# Patient Record
Sex: MALE | Race: WHITE | Employment: UNEMPLOYED | URBAN - METROPOLITAN AREA
[De-identification: names, ages, dates, MRNs, and addresses within clinical notes are randomized per-mention and may not be internally consistent; named-entity substitution may affect disease eponyms.]

---

## 2019-03-09 ENCOUNTER — APPOINTMENT (OUTPATIENT)
Dept: CT IMAGING | Facility: HOSPITAL | Age: 64
End: 2019-03-09
Attending: EMERGENCY MEDICINE
Payer: MEDICARE

## 2019-03-09 ENCOUNTER — APPOINTMENT (OUTPATIENT)
Dept: GENERAL RADIOLOGY | Facility: HOSPITAL | Age: 64
End: 2019-03-09
Attending: EMERGENCY MEDICINE
Payer: MEDICARE

## 2019-03-09 ENCOUNTER — HOSPITAL ENCOUNTER (OUTPATIENT)
Facility: HOSPITAL | Age: 64
Setting detail: OBSERVATION
Discharge: HOME | End: 2019-03-11
Attending: EMERGENCY MEDICINE | Admitting: INTERNAL MEDICINE
Payer: MEDICARE

## 2019-03-09 DIAGNOSIS — R56.9 NEW ONSET SEIZURE (HCC): Primary | ICD-10-CM

## 2019-03-09 LAB
ALBUMIN SERPL-MCNC: 4.6 G/DL (ref 3.4–5)
ALBUMIN/GLOB SERPL: 1.3 {RATIO} (ref 1–2)
ALP LIVER SERPL-CCNC: 62 U/L (ref 45–117)
ALT SERPL-CCNC: 28 U/L (ref 16–61)
AMPHET UR QL SCN: NEGATIVE
ANION GAP SERPL CALC-SCNC: 16 MMOL/L (ref 0–18)
AST SERPL-CCNC: 29 U/L (ref 15–37)
BARBITURATES UR QL SCN: NEGATIVE
BASOPHILS # BLD AUTO: 0.04 X10(3) UL (ref 0–0.2)
BASOPHILS NFR BLD AUTO: 0.4 %
BENZODIAZ UR QL SCN: NEGATIVE
BILIRUB SERPL-MCNC: 0.8 MG/DL (ref 0.1–2)
BILIRUB UR QL STRIP.AUTO: NEGATIVE
BUN BLD-MCNC: 13 MG/DL (ref 7–18)
BUN/CREAT SERPL: 9.8 (ref 10–20)
CALCIUM BLD-MCNC: 9.4 MG/DL (ref 8.5–10.1)
CHLORIDE SERPL-SCNC: 101 MMOL/L (ref 98–107)
CO2 SERPL-SCNC: 20 MMOL/L (ref 21–32)
COCAINE UR QL: NEGATIVE
CREAT BLD-MCNC: 1.32 MG/DL (ref 0.7–1.3)
CREAT UR-SCNC: 46.9 MG/DL
DEPRECATED RDW RBC AUTO: 38.3 FL (ref 35.1–46.3)
EOSINOPHIL # BLD AUTO: 0.03 X10(3) UL (ref 0–0.7)
EOSINOPHIL NFR BLD AUTO: 0.3 %
ERYTHROCYTE [DISTWIDTH] IN BLOOD BY AUTOMATED COUNT: 11.8 % (ref 11–15)
ETHANOL SERPL-MCNC: <3 MG/DL (ref ?–3)
ETHANOL UR-MCNC: NEGATIVE MG/DL
GLOBULIN PLAS-MCNC: 3.5 G/DL (ref 2.8–4.4)
GLUCOSE BLD-MCNC: 174 MG/DL (ref 70–99)
GLUCOSE UR STRIP.AUTO-MCNC: 50 MG/DL
HCT VFR BLD AUTO: 46.4 % (ref 39–53)
HGB BLD-MCNC: 16.5 G/DL (ref 13–17.5)
IMM GRANULOCYTES # BLD AUTO: 0.03 X10(3) UL (ref 0–1)
IMM GRANULOCYTES NFR BLD: 0.3 %
LEUKOCYTE ESTERASE UR QL STRIP.AUTO: NEGATIVE
LYMPHOCYTES # BLD AUTO: 1.79 X10(3) UL (ref 1–4)
LYMPHOCYTES NFR BLD AUTO: 19.8 %
M PROTEIN MFR SERPL ELPH: 8.1 G/DL (ref 6.4–8.2)
MCH RBC QN AUTO: 31.5 PG (ref 26–34)
MCHC RBC AUTO-ENTMCNC: 35.6 G/DL (ref 31–37)
MCV RBC AUTO: 88.5 FL (ref 80–100)
MONOCYTES # BLD AUTO: 0.45 X10(3) UL (ref 0.1–1)
MONOCYTES NFR BLD AUTO: 5 %
NEUTROPHILS # BLD AUTO: 6.71 X10 (3) UL (ref 1.5–7.7)
NEUTROPHILS # BLD AUTO: 6.71 X10(3) UL (ref 1.5–7.7)
NEUTROPHILS NFR BLD AUTO: 74.2 %
NITRITE UR QL STRIP.AUTO: NEGATIVE
OPIATES UR QL SCN: NEGATIVE
OSMOLALITY SERPL CALC.SUM OF ELEC: 288 MOSM/KG (ref 275–295)
PCP UR QL SCN: NEGATIVE
PH UR STRIP.AUTO: 6 [PH] (ref 4.5–8)
PLATELET # BLD AUTO: 204 10(3)UL (ref 150–450)
POTASSIUM SERPL-SCNC: 3.8 MMOL/L (ref 3.5–5.1)
PROT UR STRIP.AUTO-MCNC: 30 MG/DL
RBC # BLD AUTO: 5.24 X10(6)UL (ref 3.8–5.8)
RBC UR QL AUTO: NEGATIVE
SODIUM SERPL-SCNC: 137 MMOL/L (ref 136–145)
SP GR UR STRIP.AUTO: 1.01 (ref 1–1.03)
UROBILINOGEN UR STRIP.AUTO-MCNC: <2 MG/DL
WBC # BLD AUTO: 9.1 X10(3) UL (ref 4–11)

## 2019-03-09 PROCEDURE — 71045 X-RAY EXAM CHEST 1 VIEW: CPT | Performed by: EMERGENCY MEDICINE

## 2019-03-09 PROCEDURE — 70450 CT HEAD/BRAIN W/O DYE: CPT | Performed by: EMERGENCY MEDICINE

## 2019-03-09 RX ORDER — MULTIVIT-MIN/IRON FUM/FOLIC AC 7.5 MG-4
1 TABLET ORAL DAILY
COMMUNITY
End: 2020-03-19 | Stop reason: DRUGHIGH

## 2019-03-09 RX ORDER — LORAZEPAM 2 MG/ML
1 INJECTION INTRAMUSCULAR ONCE
Status: COMPLETED | OUTPATIENT
Start: 2019-03-09 | End: 2019-03-09

## 2019-03-09 RX ORDER — ONDANSETRON 2 MG/ML
4 INJECTION INTRAMUSCULAR; INTRAVENOUS ONCE
Status: COMPLETED | OUTPATIENT
Start: 2019-03-09 | End: 2019-03-09

## 2019-03-09 RX ORDER — SODIUM CHLORIDE 9 MG/ML
INJECTION, SOLUTION INTRAVENOUS CONTINUOUS
Status: DISCONTINUED | OUTPATIENT
Start: 2019-03-09 | End: 2019-03-11

## 2019-03-10 ENCOUNTER — APPOINTMENT (OUTPATIENT)
Dept: CV DIAGNOSTICS | Facility: HOSPITAL | Age: 64
End: 2019-03-10
Attending: CLINICAL NURSE SPECIALIST
Payer: MEDICARE

## 2019-03-10 LAB
ALBUMIN SERPL-MCNC: 4 G/DL (ref 3.4–5)
ALBUMIN/GLOB SERPL: 1.3 {RATIO} (ref 1–2)
ALP LIVER SERPL-CCNC: 53 U/L (ref 45–117)
ALT SERPL-CCNC: 24 U/L (ref 16–61)
ANION GAP SERPL CALC-SCNC: 8 MMOL/L (ref 0–18)
AST SERPL-CCNC: 30 U/L (ref 15–37)
ATRIAL RATE: 274 BPM
ATRIAL RATE: 64 BPM
ATRIAL RATE: 73 BPM
BASOPHILS # BLD AUTO: 0.04 X10(3) UL (ref 0–0.2)
BASOPHILS NFR BLD AUTO: 0.3 %
BILIRUB SERPL-MCNC: 1 MG/DL (ref 0.1–2)
BUN BLD-MCNC: 11 MG/DL (ref 7–18)
BUN/CREAT SERPL: 11.6 (ref 10–20)
CALCIUM BLD-MCNC: 8.4 MG/DL (ref 8.5–10.1)
CHLORIDE SERPL-SCNC: 108 MMOL/L (ref 98–107)
CO2 SERPL-SCNC: 25 MMOL/L (ref 21–32)
CREAT BLD-MCNC: 0.95 MG/DL (ref 0.7–1.3)
DEPRECATED RDW RBC AUTO: 37.3 FL (ref 35.1–46.3)
EOSINOPHIL # BLD AUTO: 0.05 X10(3) UL (ref 0–0.7)
EOSINOPHIL NFR BLD AUTO: 0.4 %
ERYTHROCYTE [DISTWIDTH] IN BLOOD BY AUTOMATED COUNT: 11.7 % (ref 11–15)
GLOBULIN PLAS-MCNC: 3 G/DL (ref 2.8–4.4)
GLUCOSE BLD-MCNC: 95 MG/DL (ref 70–99)
HCT VFR BLD AUTO: 41.6 % (ref 39–53)
HGB BLD-MCNC: 14.8 G/DL (ref 13–17.5)
IMM GRANULOCYTES # BLD AUTO: 0.05 X10(3) UL (ref 0–1)
IMM GRANULOCYTES NFR BLD: 0.4 %
LYMPHOCYTES # BLD AUTO: 1.53 X10(3) UL (ref 1–4)
LYMPHOCYTES NFR BLD AUTO: 13 %
M PROTEIN MFR SERPL ELPH: 7 G/DL (ref 6.4–8.2)
MCH RBC QN AUTO: 31.4 PG (ref 26–34)
MCHC RBC AUTO-ENTMCNC: 35.6 G/DL (ref 31–37)
MCV RBC AUTO: 88.1 FL (ref 80–100)
MONOCYTES # BLD AUTO: 0.91 X10(3) UL (ref 0.1–1)
MONOCYTES NFR BLD AUTO: 7.7 %
NEUTROPHILS # BLD AUTO: 9.22 X10 (3) UL (ref 1.5–7.7)
NEUTROPHILS # BLD AUTO: 9.22 X10(3) UL (ref 1.5–7.7)
NEUTROPHILS NFR BLD AUTO: 78.2 %
OSMOLALITY SERPL CALC.SUM OF ELEC: 291 MOSM/KG (ref 275–295)
P AXIS: 113 DEGREES
P AXIS: 64 DEGREES
P AXIS: 64 DEGREES
P-R INTERVAL: 170 MS
P-R INTERVAL: 176 MS
PLATELET # BLD AUTO: 197 10(3)UL (ref 150–450)
POTASSIUM SERPL-SCNC: 3.5 MMOL/L (ref 3.5–5.1)
Q-T INTERVAL: 286 MS
Q-T INTERVAL: 416 MS
Q-T INTERVAL: 428 MS
QRS DURATION: 84 MS
QRS DURATION: 88 MS
QRS DURATION: 92 MS
QTC CALCULATION (BEZET): 431 MS
QTC CALCULATION (BEZET): 441 MS
QTC CALCULATION (BEZET): 458 MS
R AXIS: -7 DEGREES
R AXIS: 26 DEGREES
R AXIS: 27 DEGREES
RBC # BLD AUTO: 4.72 X10(6)UL (ref 3.8–5.8)
SED RATE-ML: 4 MM/HR (ref 0–12)
SODIUM SERPL-SCNC: 141 MMOL/L (ref 136–145)
T AXIS: 21 DEGREES
T AXIS: 41 DEGREES
T AXIS: 42 DEGREES
T4 FREE SERPL-MCNC: 1 NG/DL (ref 0.8–1.7)
TSI SER-ACNC: 1.61 MIU/ML (ref 0.36–3.74)
VENTRICULAR RATE: 137 BPM
VENTRICULAR RATE: 64 BPM
VENTRICULAR RATE: 73 BPM
WBC # BLD AUTO: 11.8 X10(3) UL (ref 4–11)

## 2019-03-10 PROCEDURE — 99203 OFFICE O/P NEW LOW 30 MIN: CPT | Performed by: OTHER

## 2019-03-10 PROCEDURE — 95819 EEG AWAKE AND ASLEEP: CPT | Performed by: OTHER

## 2019-03-10 RX ORDER — LEVETIRACETAM 500 MG/1
500 TABLET ORAL 2 TIMES DAILY
Status: DISCONTINUED | OUTPATIENT
Start: 2019-03-10 | End: 2019-03-11

## 2019-03-10 RX ORDER — IBUPROFEN 600 MG/1
600 TABLET ORAL EVERY 8 HOURS PRN
Status: DISCONTINUED | OUTPATIENT
Start: 2019-03-10 | End: 2019-03-11

## 2019-03-10 RX ORDER — LEVETIRACETAM 500 MG/1
500 TABLET ORAL 2 TIMES DAILY
Status: DISCONTINUED | OUTPATIENT
Start: 2019-03-10 | End: 2019-03-10

## 2019-03-10 RX ORDER — LORAZEPAM 2 MG/ML
1 INJECTION INTRAMUSCULAR
Status: ACTIVE | OUTPATIENT
Start: 2019-03-10 | End: 2019-03-10

## 2019-03-10 NOTE — CM/SW NOTE
RN stating the pt is about to leave AMA due to medical bill costs and wanted to know how much his bill will be. SW spoke w/pt. He stated, \"I'm fine, I feel bad for making my insurance pay for all these tests. \" Informed him SW has no way of knowing how mu

## 2019-03-10 NOTE — PLAN OF CARE
Patient admitted from ED. Oriented to the unit and the room. Admission navigator completed. Patient denies any health history. States he was supposed to be taking medication for hypertension but he does not and controls it with diet and exercise.  Vital sig

## 2019-03-10 NOTE — ED INITIAL ASSESSMENT (HPI)
Pt arrives to ER w/ report of seizure activity per family and postictal per EMS w/ incontinence noted. Family reports patient has no prior hx of seizures.      Pt denies head trauma but states he was getting intermittent dizzy spells when he was sleeping la

## 2019-03-10 NOTE — CONSULTS
[facility] Cardiology  Report of Consultation    Shilpa Sparks Patient Status:  Observation    1955 MRN [id number]   [facility] 7NE-A Attending Iwona Chong MD   Hosp Day # 0 PCP Nyla Reinoso MD     Reason for C Facility-Administered Medications:   •  ibuprofen (MOTRIN) tab 600 mg, 600 mg, Oral, Q8H PRN  •  LORazepam (ATIVAN) injection 1 mg, 1 mg, Intravenous, On Call  •  levETIRAcetam (KEPPRA) tab 500 mg, 500 mg, Oral, BID  •  [COMPLETED] sodium chloride 0.9% IV INTRACRANIAL:  There are no abnormal extraaxial fluid collections. There is no midline shift. There are no intraparenchymal brain abnormalities. There is nothing specific for acute infarct. There is no hemorrhage or mass lesion.   SINUSES:           No atrial flutter in ER last PM - converted spontaneously to sinus in ER and stays in sinus since that time - could be due to sz. Repeated ECG basically normal with no ischemia. Brief atrial flutter could be due to shakiness and seizures.  N/o h/o sz or aflut

## 2019-03-10 NOTE — PLAN OF CARE
Assumed patient care at 0730.  Vital signs stable   Neuro checks q4h- no deficits   Seizure precautions maintained   EEG done   MRI brain ordered   Cardiology on consult, ECHO ordered   Keppra started   Plan of care discussed with patient and wife, will con

## 2019-03-10 NOTE — H&P
[address] [facility] Patient Status:  Observation    1955 MRN [id number]   [facility] 7NE-A Attending Jaylan Yarbrough MD   Hosp Day # 0 PCP Allan Chavez MD     History of Present Illness:  Denise Mcdonald BMI 22.89 kg/m²    INTEG: WARM, DRY NO RASHES OR LESIONS  HEENT:  NC/NT PERRLA, EOMI, TM’S/EAC’S UNREMARKABLE; OROPHARYNX W/O LESIONS  NECK: SUPPLE; NO JVD, BRUITS, LYMPHADENOPATHY, THYROIMEGALY  THORAX: NO CVAT, INGUINAL OR AXILLARY LYMPHADENOPATHY  LUNGS brain abnormalities. There is nothing specific for acute infarct. There is no hemorrhage or mass lesion. SINUSES:           No sign of acute sinusitis. MASTOIDS:          No sign of acute inflammation.   SKULL:             No evidence for fracture o

## 2019-03-10 NOTE — PROCEDURES
ELECTROENCEPHALOGRAM REPORT      Patient Name: Tono Crane   : 1955   Requesting Physician: Dr. Alfonso Gray   Date of Test: 3/10/2019   History: 70year old male with generalized tonic clonic seizure in the setting of recent brief episodes of lightheade

## 2019-03-10 NOTE — CONSULTS
[facility]    Report of Consultation    Romayne Going Patient Status:  Observation    1955 MRN [id number]   [facility] 7NE-A Attending Mindi Pike MD   Hosp Day # 0 PCP Bear Centeno MD     Date of Admission:  3/9/2019 Oral, Q8H PRN  •  [COMPLETED] sodium chloride 0.9% IV bolus 500 mL, 500 mL, Intravenous, Once **FOLLOWED BY** 0.9%  NaCl infusion, , Intravenous, Continuous    Review of Systems:  A 10-point system was reviewed.   Pertinent positives and negatives are noted stereotyped and have no relation to orthostatic position, and patient is not hypotensive. My suspicion for partial seizures with secondarily generalization based on clinical history is very high.  MRI brain with and without contrast is recommended, as well

## 2019-03-10 NOTE — ED PROVIDER NOTES
Patient Seen in: [facility] Emergency Department    History   Patient presents with:  Seizure Disorder (neurologic)    Stated Complaint: New onset seizure     HPI    This is a 38-year-old male who presents from home via paramedics with a new onset s Ht 175.3 cm (5' 9\")   Wt 68 kg   SpO2 100%   BMI 22.15 kg/m²         Physical Exam  GENERAL: Awake, alert oriented to name, place, year but cannot state the president,Nontoxic appearing. SKIN: Normal, warm, and dry.   HEENT:  Pupils equally round and arelis Abnormality         Status                     ---------                               -----------         ------                     CBC W/ DIFFERENTIAL[[id number]]                              Final result                 Please view results for these te Approved by: Randall Chauhan DO            Xr Chest Ap Portable  (cpt=71045)    Result Date: 3/9/2019  PROCEDURE:  XR CHEST AP PORTABLE  (CPT=71045)  TECHNIQUE:  AP chest radiograph was obtained. COMPARISON:  None.   INDICATIONS:  New onset seizure  PATIENT further workup can be done or if he can get an outpatient open MRI. He is aware of plan for admission. He is comfortable with the plan. Wife is at bedside. He was admitted to a cardiac telemetry bed for further evaluation with seizure precautions.

## 2019-03-11 ENCOUNTER — APPOINTMENT (OUTPATIENT)
Dept: CV DIAGNOSTICS | Facility: HOSPITAL | Age: 64
End: 2019-03-11
Attending: CLINICAL NURSE SPECIALIST
Payer: MEDICARE

## 2019-03-11 ENCOUNTER — APPOINTMENT (OUTPATIENT)
Dept: MRI IMAGING | Facility: HOSPITAL | Age: 64
End: 2019-03-11
Attending: Other
Payer: MEDICARE

## 2019-03-11 VITALS
BODY MASS INDEX: 22.96 KG/M2 | SYSTOLIC BLOOD PRESSURE: 135 MMHG | HEART RATE: 54 BPM | DIASTOLIC BLOOD PRESSURE: 85 MMHG | WEIGHT: 155 LBS | TEMPERATURE: 98 F | HEIGHT: 69 IN | RESPIRATION RATE: 20 BRPM | OXYGEN SATURATION: 98 %

## 2019-03-11 LAB
ANION GAP SERPL CALC-SCNC: 7 MMOL/L (ref 0–18)
BUN BLD-MCNC: 20 MG/DL (ref 7–18)
BUN/CREAT SERPL: 20 (ref 10–20)
CALCIUM BLD-MCNC: 8.4 MG/DL (ref 8.5–10.1)
CHLORIDE SERPL-SCNC: 111 MMOL/L (ref 98–107)
CO2 SERPL-SCNC: 26 MMOL/L (ref 21–32)
CREAT BLD-MCNC: 1 MG/DL (ref 0.7–1.3)
GLUCOSE BLD-MCNC: 88 MG/DL (ref 70–99)
OSMOLALITY SERPL CALC.SUM OF ELEC: 300 MOSM/KG (ref 275–295)
POTASSIUM SERPL-SCNC: 3.7 MMOL/L (ref 3.5–5.1)
SODIUM SERPL-SCNC: 144 MMOL/L (ref 136–145)

## 2019-03-11 PROCEDURE — 70553 MRI BRAIN STEM W/O & W/DYE: CPT | Performed by: OTHER

## 2019-03-11 PROCEDURE — 93306 TTE W/DOPPLER COMPLETE: CPT | Performed by: CLINICAL NURSE SPECIALIST

## 2019-03-11 RX ORDER — LEVETIRACETAM 500 MG/1
500 TABLET ORAL 2 TIMES DAILY
Qty: 60 TABLET | Refills: 3 | Status: SHIPPED
Start: 2019-03-11 | End: 2019-03-11

## 2019-03-11 RX ORDER — POTASSIUM CHLORIDE 20 MEQ/1
40 TABLET, EXTENDED RELEASE ORAL ONCE
Status: COMPLETED | OUTPATIENT
Start: 2019-03-11 | End: 2019-03-11

## 2019-03-11 RX ORDER — LEVETIRACETAM 500 MG/1
500 TABLET ORAL 2 TIMES DAILY
Qty: 60 TABLET | Refills: 3 | Status: ON HOLD | OUTPATIENT
Start: 2019-03-11 | End: 2020-01-28

## 2019-03-11 RX ORDER — LEVETIRACETAM 500 MG/1
500 TABLET ORAL 2 TIMES DAILY
Status: DISCONTINUED | OUTPATIENT
Start: 2019-03-11 | End: 2019-03-11

## 2019-03-11 RX ORDER — LORAZEPAM 2 MG/ML
1 INJECTION INTRAMUSCULAR ONCE
Status: COMPLETED | OUTPATIENT
Start: 2019-03-11 | End: 2019-03-11

## 2019-03-11 NOTE — PLAN OF CARE
Impaired Activities of Daily Living    • Achieve highest/safest level of independence in self care Adequate for Discharge        Impaired Functional Mobility    • Achieve highest/safest level of mobility/gait Adequate for Discharge        NEUROLOGICAL - AD

## 2019-03-11 NOTE — PROGRESS NOTES
[facility]    Progress Note    Judith Ricci Patient Status:  Observation    1955 MRN [id number]   [facility] 7NE-A Attending Joya Miles MD   Hosp Day # 0 PCP Lillie Hunt MD       SUBJECTIVE:  No CP, SOB, or N/V.NO no evidence of cortical dysplasia or migrational abnormality. No heterotopic gray matter is identified. The sulcal gyral pattern is within normal limits for a patient of this age.      There is a small focus of gradient susceptibility within the posterior m TX AS OUTPT    MILD SANTO- IMPROVING WITH HYDRATION    HYPOKALEMIA-BETTER WITH SUPPLEMENTATION    GENERAL-AWAITING ECHO RESULT. ANTICIPATE D/C LATER TODAY IF STABLE. Questions/concerns were discussed with patient and/or family by bedside.     Total Time

## 2019-03-11 NOTE — PROGRESS NOTES
[facility]  Cardiology Progress Note    Subjective:  No chest pain or shortness of breath.     Objective:  /71 (BP Location: Right arm)   Pulse 54   Temp 97.9 °F (36.6 °C) (Oral)   Resp 20   Ht 5' 9\" (1.753 m)   Wt 155 lb (70.3 kg)   SpO2 98% CHADS-VASC score (age, HTN) however this was an isolated event during acute stress, evaluate for recurrent flutter on event monitor.  If recurrent then would start anticoagulation and discuss CTI ablation  - follow up with me in 6 weeks in clinic    Seema Beech

## 2019-03-11 NOTE — PLAN OF CARE
Pt AOx4. RA.  .  NSR. Pt c/o pain 3/10. Motrin po pain relief. Pt to have MRI of Brain and ECHO in am.  Keppra PO. Neuro Q4. Will continue to monitor.

## 2019-03-11 NOTE — PROGRESS NOTES
[id number] [facility] Neurology Progress Note    Mohamud Hughes Patient Status:  Observation    1955 MRN [id number]   [facility] 7NE-A Attending Portia Wong MD   Hosp Day # 0 PCP Tamanna Hdaley MD         Subjective:  Arcelia Funk possible partial seizure with secondary generalization     Diagnostics/Imaging    EEG: borderline: no epileptiform activity, minimal slow wave abnormality over left parieto-occipital region   HCT: unremarkable   MRI brain w/wo: pending   Echo pending     P meetings - patient not present --non F2F  Non Face to Face CPT code 70966/78475 applies as documented above    High risk    (   ) Drug monitoring    (   ) PCA    (   ) Organ failure    (   ) De-escalation of treatment - DNR    (   ) Acute neurologic change

## 2019-03-14 ENCOUNTER — TELEPHONE (OUTPATIENT)
Dept: CARDIOLOGY | Age: 64
End: 2019-03-14

## 2019-03-18 ENCOUNTER — TELEPHONE (OUTPATIENT)
Dept: CARDIOLOGY | Age: 64
End: 2019-03-18

## 2019-03-19 NOTE — DISCHARGE SUMMARY
[facility]  Discharge Summary    Bia Lyons Patient Status:  Observation    1955 MRN [id number]   [facility] 7NE-A Attending No att. providers found   Hosp Day # 0 PCP Yoni Madison MD     Date of Admission: 3/9/2019    Date

## 2019-04-01 ENCOUNTER — HOSPITAL ENCOUNTER (OUTPATIENT)
Dept: CV DIAGNOSTICS | Facility: HOSPITAL | Age: 64
Discharge: HOME | End: 2019-04-01
Attending: INTERNAL MEDICINE
Payer: MEDICARE

## 2019-04-01 DIAGNOSIS — R56.9 NEW ONSET SEIZURE (HCC): ICD-10-CM

## 2019-04-01 PROCEDURE — 93270 REMOTE 30 DAY ECG REV/REPORT: CPT | Performed by: INTERNAL MEDICINE

## 2019-04-01 PROCEDURE — 93271 ECG/MONITORING AND ANALYSIS: CPT | Performed by: INTERNAL MEDICINE

## 2019-04-01 PROCEDURE — 93272 ECG/REVIEW INTERPRET ONLY: CPT | Performed by: INTERNAL MEDICINE

## 2019-04-02 ENCOUNTER — OFFICE VISIT (OUTPATIENT)
Dept: NEUROLOGY | Facility: CLINIC | Age: 64
End: 2019-04-02
Payer: MEDICARE

## 2019-04-02 VITALS
DIASTOLIC BLOOD PRESSURE: 72 MMHG | WEIGHT: 161 LBS | SYSTOLIC BLOOD PRESSURE: 126 MMHG | BODY MASS INDEX: 24 KG/M2 | HEART RATE: 74 BPM | RESPIRATION RATE: 16 BRPM

## 2019-04-02 DIAGNOSIS — G40.109 SYMPTOMATIC LOCALIZATION-RELATED EPILEPSY (HCC): Primary | ICD-10-CM

## 2019-04-02 DIAGNOSIS — M79.604 PAIN IN BOTH LOWER EXTREMITIES: ICD-10-CM

## 2019-04-02 DIAGNOSIS — M79.605 PAIN IN BOTH LOWER EXTREMITIES: ICD-10-CM

## 2019-04-02 PROCEDURE — 1111F DSCHRG MED/CURRENT MED MERGE: CPT | Performed by: OTHER

## 2019-04-02 PROCEDURE — 99214 OFFICE O/P EST MOD 30 MIN: CPT | Performed by: OTHER

## 2019-04-02 RX ORDER — OMEGA-3 FATTY ACIDS/FISH OIL 300-1000MG
1 CAPSULE ORAL AS NEEDED
COMMUNITY

## 2019-04-02 NOTE — PROGRESS NOTES
The patient is here for a follow-up from a hospital visit on 03/09/19. The patient has a seizures.  The patient has denies any seizures since leaving the hospital.

## 2019-04-02 NOTE — PROGRESS NOTES
[facility] in [place]  Neurology - Clinic Follow up  2019, 8:20 AM     Rosa Garcia Patient Status:  No patient class for patient encounter    1955 MRN [id number]   Location [unfilled] PCP Kenzie Luo MD          Sub levETIRAcetam 500 MG Oral Tab, Take 1 tablet (500 mg total) by mouth 2 (two) times daily. , Disp: 60 tablet, Rfl: 3  •  Multiple Vitamins-Minerals (MULTI-VITAMIN/MINERALS) Oral Tab, Take 1 tablet by mouth daily. , Disp: , Rfl:     REVIEW OF SYSTEMS:  General GAIT:  Normal gait, can do  tandem walk, romberg test is negative,   Special tests:     Labs:  Lab Results   Component Value Date    GLU 88 03/11/2019    BUN 20 (H) 03/11/2019    CREATSERUM 1.00 03/11/2019    BUNCREA 20.0 03/11/2019    ANIONGAP 7 03/11/2

## 2019-04-09 ENCOUNTER — TELEPHONE (OUTPATIENT)
Dept: CARDIOLOGY | Age: 64
End: 2019-04-09

## 2019-04-15 ENCOUNTER — DOCUMENTATION (OUTPATIENT)
Dept: CARDIOLOGY | Age: 64
End: 2019-04-15

## 2019-04-29 ENCOUNTER — TELEPHONE (OUTPATIENT)
Dept: NEUROLOGY | Facility: CLINIC | Age: 64
End: 2019-04-29

## 2019-04-29 DIAGNOSIS — G40.109 SYMPTOMATIC LOCALIZATION-RELATED EPILEPSY (HCC): Primary | ICD-10-CM

## 2019-04-29 DIAGNOSIS — G40.109 SYMPTOMATIC LOCALIZATION-RELATED EPILEPSY (HCC): ICD-10-CM

## 2019-04-29 RX ORDER — DIVALPROEX SODIUM 500 MG/1
500 TABLET, EXTENDED RELEASE ORAL NIGHTLY
Qty: 30 TABLET | Refills: 0 | Status: SHIPPED | OUTPATIENT
Start: 2019-04-29 | End: 2019-05-03

## 2019-04-29 NOTE — TELEPHONE ENCOUNTER
Pt's spouse (on HIPAA) said pt is not taking Keppra because he's having suicidal thoughts and is very aggressive. Would like to discuss further with RN.

## 2019-04-29 NOTE — TELEPHONE ENCOUNTER
Patient's spouse call [facility] ([id number] per HIPPA) to request that Keppra be stopped due to side effects. Patient is taking Keppra 250 mg and 500 mg. (orderd as 500 mg BID). Patient's spouse reports that patient has not had a seizure since hospital admission.  Per th 30 day supply of Depakote 500 ER and Valproic Acid level. Called patient's spouse and given the above directions. Patient's spouse able to give correct verbal read back and she states she has written down instructions. Routed to provider to review.

## 2019-04-30 ENCOUNTER — TELEPHONE (OUTPATIENT)
Dept: CARDIOLOGY | Age: 64
End: 2019-04-30

## 2019-04-30 NOTE — TELEPHONE ENCOUNTER
Deny due to patient just starting on this medication due to side effects of the Keppra.        Medication: DIVALPROEX SODIUM  MG Oral Tablet 24 Hr    Date of last refill: 04/29/19 (#30/0)  [place]    Last office

## 2019-04-30 NOTE — TELEPHONE ENCOUNTER
Patient's spouse calling [facility] to inform provider that patient is refusing to start Depakote because he has read online that there is a potential for liver damage and he had Hepatitis B in his 25s.  Per patient's spouse, patient does NOT want to be on any med

## 2019-04-30 NOTE — TELEPHONE ENCOUNTER
Also, Keppra level being checked is recommended. We can try to lower dose (250mg BID), he just has to be aware that there is a risk of breakthrough seizure. Keppra level takes 1 week to come back.

## 2019-04-30 NOTE — TELEPHONE ENCOUNTER
Can offer for urgent follow up in my clinic. I do not recommend stopping antiepileptic medication, due to risk for another seizure. Another option would be trileptal, if he does not want to try Depakote.  Thirdly, lamictal. The latter have a risk of liver i

## 2019-04-30 NOTE — TELEPHONE ENCOUNTER
Called patient's spouse (Amina Vila per [employer]) and informed her of the below information from Dr. Chip Hussein; Note      Also, Keppra level being checked is recommended.  We can try to lower dose (250mg BID), he just has to be aware that there is a risk of breakthrou

## 2019-05-03 ENCOUNTER — OFFICE VISIT (OUTPATIENT)
Dept: NEUROLOGY | Facility: CLINIC | Age: 64
End: 2019-05-03
Payer: MEDICARE

## 2019-05-03 VITALS
HEART RATE: 70 BPM | DIASTOLIC BLOOD PRESSURE: 70 MMHG | RESPIRATION RATE: 18 BRPM | WEIGHT: 160 LBS | BODY MASS INDEX: 24 KG/M2 | SYSTOLIC BLOOD PRESSURE: 124 MMHG

## 2019-05-03 DIAGNOSIS — G40.109 SYMPTOMATIC LOCALIZATION-RELATED EPILEPSY (HCC): Primary | ICD-10-CM

## 2019-05-03 DIAGNOSIS — I49.9 CARDIAC ARRHYTHMIA, UNSPECIFIED CARDIAC ARRHYTHMIA TYPE: ICD-10-CM

## 2019-05-03 PROCEDURE — 99215 OFFICE O/P EST HI 40 MIN: CPT | Performed by: OTHER

## 2019-05-03 NOTE — PROGRESS NOTES
The patient is here for a follow-up for seizures. The patient states no seizures since his last office visit. The patient states he was having suicidal thoughts, since starting his medication however the patient has not had a thought in over a week.  The pa

## 2019-05-03 NOTE — PROGRESS NOTES
[facility] in [place]  Neurology - Clinic Follow up      Mohamud Hughes Patient Status:  No patient class for patient encounter    1955 MRN [id number]   Location [unfilled] PCP Tamanna Hadley MD          Subjective:  [place] Clinical significance of this is   uncertain, and clinical correlation is needed.     PAST MEDICAL HISTORY:   Past Medical History:   Diagnosis Date   • Essential hypertension    • High blood pressure    • Seizure disorder (HCC)        PAST SURGICAL HISTORY present,  Neurologic Examination  Mental Status  Is intact for age, and Language is intact, no slurred speech; calm, no acute stress, pleasant,   CN is normal from II to XII, visual field is full, EOMI, pupil symmetrical, light reflexes are present, fundus this time. He will continue Keppra 250/500mg, and is aware of the increased seizure risk.  If he is still having mood side effects after 2 weeks, despite improving sleep, recommended that again at that point he should start and overlap with another antiepi

## 2019-05-16 ENCOUNTER — APPOINTMENT (OUTPATIENT)
Dept: CARDIOLOGY | Age: 64
End: 2019-05-16

## 2019-05-16 RX ORDER — DIVALPROEX SODIUM 500 MG/1
TABLET, EXTENDED RELEASE ORAL
Qty: 90 TABLET | Refills: 0 | OUTPATIENT
Start: 2019-05-16

## 2019-05-29 ENCOUNTER — TELEPHONE (OUTPATIENT)
Dept: NEUROLOGY | Facility: CLINIC | Age: 64
End: 2019-05-29

## 2019-06-03 ENCOUNTER — OFFICE VISIT (OUTPATIENT)
Dept: NEUROLOGY | Facility: CLINIC | Age: 64
End: 2019-06-03
Payer: MEDICARE

## 2019-06-03 VITALS
DIASTOLIC BLOOD PRESSURE: 90 MMHG | WEIGHT: 155 LBS | RESPIRATION RATE: 14 BRPM | BODY MASS INDEX: 23 KG/M2 | SYSTOLIC BLOOD PRESSURE: 136 MMHG | HEART RATE: 70 BPM

## 2019-06-03 DIAGNOSIS — G40.109 SYMPTOMATIC LOCALIZATION-RELATED EPILEPSY (HCC): Primary | ICD-10-CM

## 2019-06-03 PROCEDURE — 99214 OFFICE O/P EST MOD 30 MIN: CPT | Performed by: OTHER

## 2019-06-03 RX ORDER — OXCARBAZEPINE 300 MG/1
TABLET, FILM COATED ORAL
Qty: 360 TABLET | Refills: 1 | OUTPATIENT
Start: 2019-06-03

## 2019-06-03 RX ORDER — OXCARBAZEPINE 300 MG/1
TABLET, FILM COATED ORAL
Qty: 120 TABLET | Refills: 1 | Status: ON HOLD | OUTPATIENT
Start: 2019-06-03 | End: 2020-01-28

## 2019-06-03 NOTE — PATIENT INSTRUCTIONS
Refill policies:    • Allow 2-3 business days for refills; controlled substances may take longer.   • Contact your pharmacy at least 5 days prior to running out of medication and have them send an electronic request or submit request through the “request re been approved by your insurer. Depending on your insurance carrier, approval may take 3-10 days. It is highly recommended patients contact their insurance carrier directly to determine coverage.   If test is done without insurance authorization, patient ma 1 week, then decrease to 1/2 tab nightly, do this for a week, then stop (assuming tolerating and continuing Oxcarbazepine)  Blood in 1 month

## 2019-06-03 NOTE — TELEPHONE ENCOUNTER
Deny 90 day request, the patient was just started on this medication.      Medication: OXCARBAZEPINE 300 MG Oral Tab    Date of last refill: 06/03/19 (#120/1)  [place]    Last office visit: 6/3/2019  Due back to clini weeks, despite improving sleep, recommended that again at that point he should start and overlap with another antiepileptic, with further decreasing Keppra dose to 250mg BID.  He will think about whether he would like to start another antiepileptic, and is

## 2019-06-03 NOTE — PROGRESS NOTES
[facility] in [facility]  Neurology - Clinic Follow up      Lorenza Pearl Patient Status:  No patient class for patient encounter    1955 MRN [id number]   Location [unfilled] PCP Patty Azar MD          Subjective:  Bebe Dancer awake and asleep electroencephalogram.    There is no epileptiform activity seen in this record. There was   a minimal slow wave abnormality seen over the left   parieto-occipital region.  Clinical significance of this is   uncertain, and clinical correlati intolerance, appetite is normal, no weight change;  Skin: warm, no rash, no allergy , no skin bruise or abnormal bleeding,     Objective:  Blood pressure 136/90, pulse 70, resp. rate 14, weight 155 lb. Body mass index is 22.89 kg/m². Vitals reviewed.   Phy chance of having another GTC seizure greatly  Discussed different AED options  Patient decided on Trileptal overlap with Keppra  Weaning off Keppra due to irritation side effects  Oxcarbazepine- start 150mg BID x 3 days, then 300mg BID x 7 days, then if to

## 2019-07-19 ENCOUNTER — TELEPHONE (OUTPATIENT)
Dept: NEUROLOGY | Facility: CLINIC | Age: 64
End: 2019-07-19

## 2019-08-15 ENCOUNTER — ORDER TRANSCRIPTION (OUTPATIENT)
Dept: SLEEP CENTER | Age: 64
End: 2019-08-15

## 2019-08-15 DIAGNOSIS — G47.00 INSOMNIA: Primary | ICD-10-CM

## 2019-08-16 ENCOUNTER — ORDER TRANSCRIPTION (OUTPATIENT)
Dept: SLEEP CENTER | Age: 64
End: 2019-08-16

## 2019-08-16 DIAGNOSIS — G47.00 INSOMNIA: Primary | ICD-10-CM

## 2020-01-27 ENCOUNTER — HOSPITAL ENCOUNTER (OUTPATIENT)
Facility: HOSPITAL | Age: 65
Setting detail: OBSERVATION
LOS: 1 days | Discharge: HOME | End: 2020-01-28
Attending: EMERGENCY MEDICINE | Admitting: INTERNAL MEDICINE
Payer: MEDICARE

## 2020-01-27 ENCOUNTER — APPOINTMENT (OUTPATIENT)
Dept: CT IMAGING | Facility: HOSPITAL | Age: 65
End: 2020-01-27
Attending: EMERGENCY MEDICINE
Payer: MEDICARE

## 2020-01-27 DIAGNOSIS — G40.909 SEIZURE DISORDER (HCC): Primary | ICD-10-CM

## 2020-01-27 PROBLEM — R73.9 HYPERGLYCEMIA: Status: ACTIVE | Noted: 2020-01-27

## 2020-01-27 PROBLEM — E87.6 HYPOKALEMIA: Status: ACTIVE | Noted: 2020-01-27

## 2020-01-27 LAB
ALBUMIN SERPL-MCNC: 4.5 G/DL (ref 3.4–5)
ALBUMIN/GLOB SERPL: 1.1 {RATIO} (ref 1–2)
ALP LIVER SERPL-CCNC: 69 U/L (ref 45–117)
ALT SERPL-CCNC: 35 U/L (ref 16–61)
ANION GAP SERPL CALC-SCNC: 7 MMOL/L (ref 0–18)
AST SERPL-CCNC: 24 U/L (ref 15–37)
BASOPHILS # BLD AUTO: 0.03 X10(3) UL (ref 0–0.2)
BASOPHILS NFR BLD AUTO: 0.3 %
BILIRUB SERPL-MCNC: 0.8 MG/DL (ref 0.1–2)
BUN BLD-MCNC: 15 MG/DL (ref 7–18)
BUN/CREAT SERPL: 12.5 (ref 10–20)
CALCIUM BLD-MCNC: 9.6 MG/DL (ref 8.5–10.1)
CHLORIDE SERPL-SCNC: 104 MMOL/L (ref 98–112)
CO2 SERPL-SCNC: 29 MMOL/L (ref 21–32)
CREAT BLD-MCNC: 1.2 MG/DL (ref 0.7–1.3)
DEPRECATED RDW RBC AUTO: 41.5 FL (ref 35.1–46.3)
EOSINOPHIL # BLD AUTO: 0 X10(3) UL (ref 0–0.7)
EOSINOPHIL NFR BLD AUTO: 0 %
ERYTHROCYTE [DISTWIDTH] IN BLOOD BY AUTOMATED COUNT: 12.2 % (ref 11–15)
GLOBULIN PLAS-MCNC: 4 G/DL (ref 2.8–4.4)
GLUCOSE BLD-MCNC: 117 MG/DL (ref 70–99)
GLUCOSE BLD-MCNC: 142 MG/DL (ref 70–99)
HCT VFR BLD AUTO: 47.8 % (ref 39–53)
HGB BLD-MCNC: 16.3 G/DL (ref 13–17.5)
IMM GRANULOCYTES # BLD AUTO: 0.06 X10(3) UL (ref 0–1)
IMM GRANULOCYTES NFR BLD: 0.6 %
LYMPHOCYTES # BLD AUTO: 1.34 X10(3) UL (ref 1–4)
LYMPHOCYTES NFR BLD AUTO: 12.7 %
M PROTEIN MFR SERPL ELPH: 8.5 G/DL (ref 6.4–8.2)
MCH RBC QN AUTO: 31.6 PG (ref 26–34)
MCHC RBC AUTO-ENTMCNC: 34.1 G/DL (ref 31–37)
MCV RBC AUTO: 92.6 FL (ref 80–100)
MONOCYTES # BLD AUTO: 0.26 X10(3) UL (ref 0.1–1)
MONOCYTES NFR BLD AUTO: 2.5 %
NEUTROPHILS # BLD AUTO: 8.88 X10 (3) UL (ref 1.5–7.7)
NEUTROPHILS # BLD AUTO: 8.88 X10(3) UL (ref 1.5–7.7)
NEUTROPHILS NFR BLD AUTO: 83.9 %
OSMOLALITY SERPL CALC.SUM OF ELEC: 293 MOSM/KG (ref 275–295)
PLATELET # BLD AUTO: 233 10(3)UL (ref 150–450)
POTASSIUM SERPL-SCNC: 3.3 MMOL/L (ref 3.5–5.1)
RBC # BLD AUTO: 5.16 X10(6)UL (ref 3.8–5.8)
SODIUM SERPL-SCNC: 140 MMOL/L (ref 136–145)
WBC # BLD AUTO: 10.6 X10(3) UL (ref 4–11)

## 2020-01-27 PROCEDURE — 70496 CT ANGIOGRAPHY HEAD: CPT | Performed by: EMERGENCY MEDICINE

## 2020-01-27 PROCEDURE — 70498 CT ANGIOGRAPHY NECK: CPT | Performed by: EMERGENCY MEDICINE

## 2020-01-27 PROCEDURE — 70450 CT HEAD/BRAIN W/O DYE: CPT | Performed by: EMERGENCY MEDICINE

## 2020-01-27 RX ORDER — LORAZEPAM 2 MG/ML
INJECTION INTRAMUSCULAR
Status: COMPLETED
Start: 2020-01-27 | End: 2020-01-27

## 2020-01-28 VITALS
OXYGEN SATURATION: 100 % | RESPIRATION RATE: 18 BRPM | SYSTOLIC BLOOD PRESSURE: 148 MMHG | TEMPERATURE: 98 F | HEART RATE: 68 BPM | BODY MASS INDEX: 23 KG/M2 | DIASTOLIC BLOOD PRESSURE: 82 MMHG | WEIGHT: 156.31 LBS

## 2020-01-28 DIAGNOSIS — G40.109 SYMPTOMATIC LOCALIZATION-RELATED EPILEPSY (HCC): Primary | ICD-10-CM

## 2020-01-28 LAB
ATRIAL RATE: 75 BPM
P AXIS: 62 DEGREES
P-R INTERVAL: 168 MS
Q-T INTERVAL: 412 MS
QRS DURATION: 90 MS
QTC CALCULATION (BEZET): 460 MS
R AXIS: 10 DEGREES
T AXIS: 49 DEGREES
VENTRICULAR RATE: 75 BPM

## 2020-01-28 PROCEDURE — 99214 OFFICE O/P EST MOD 30 MIN: CPT | Performed by: OTHER

## 2020-01-28 RX ORDER — SODIUM CHLORIDE 9 MG/ML
INJECTION, SOLUTION INTRAVENOUS CONTINUOUS
Status: ACTIVE | OUTPATIENT
Start: 2020-01-28 | End: 2020-01-28

## 2020-01-28 RX ORDER — OXCARBAZEPINE 150 MG/1
150 TABLET, FILM COATED ORAL NIGHTLY
Status: DISCONTINUED | OUTPATIENT
Start: 2020-01-28 | End: 2020-01-28

## 2020-01-28 RX ORDER — OXCARBAZEPINE 150 MG/1
150 TABLET, FILM COATED ORAL 2 TIMES DAILY
Status: DISCONTINUED | OUTPATIENT
Start: 2020-01-28 | End: 2020-01-28

## 2020-01-28 RX ORDER — ONDANSETRON 2 MG/ML
4 INJECTION INTRAMUSCULAR; INTRAVENOUS EVERY 4 HOURS PRN
Status: DISCONTINUED | OUTPATIENT
Start: 2020-01-28 | End: 2020-01-28

## 2020-01-28 RX ORDER — OXCARBAZEPINE 300 MG/1
TABLET, FILM COATED ORAL
Qty: 60 TABLET | Refills: 1 | Status: SHIPPED | OUTPATIENT
Start: 2020-01-28 | End: 2020-03-20

## 2020-01-28 RX ORDER — SODIUM CHLORIDE 9 MG/ML
INJECTION, SOLUTION INTRAVENOUS CONTINUOUS
Status: DISCONTINUED | OUTPATIENT
Start: 2020-01-28 | End: 2020-01-28

## 2020-01-28 RX ORDER — HYDROMORPHONE HYDROCHLORIDE 1 MG/ML
0.5 INJECTION, SOLUTION INTRAMUSCULAR; INTRAVENOUS; SUBCUTANEOUS EVERY 30 MIN PRN
Status: ACTIVE | OUTPATIENT
Start: 2020-01-28 | End: 2020-01-28

## 2020-01-28 NOTE — ED PROVIDER NOTES
Patient Seen in: [facility] Emergency Department      History   Patient presents with:  Stroke    Stated Complaint: r.o stroke    HPI    18-year-old male who presents here to the emergency department brought in by EMS personnel due to report that he ocular motions are intact. No scleral icterus or conjunctival pallor: Neck is supple without tenderness on palpation. Head is atraumatic normocephalic. Oral mucosa moist.  Tongue is midline. No tongue biting at this time.   No posterior pharyngeal lesio (TRILEPTAL), SERUM   LEVETIRACETAM, S   RAINBOW DRAW BLUE   RAINBOW DRAW LAVENDER   RAINBOW DRAW LIGHT GREEN   RAINBOW DRAW GOLD     EKG    Rate, intervals and axes as noted on EKG Report.   Rate: 75  Rhythm: Sinus Rhythm  Reading: Normal sinus rhythm nonsp I spoke to his primary service. He was admitted in good condition.   The patient then started saying that he wanted to sign out [address] but when he spoke to his wife and I explained to the patient his risk of signing out would be risk of sta

## 2020-01-28 NOTE — ED NOTES
PT in room resting comfortably, bilateral side rails elevated, call light in reach, Pt on cardiac monitor, vitals stable, Pt breathing relaxed and non labored

## 2020-01-28 NOTE — H&P
[address] [facility] Patient Status:  Observation    1955 MRN [id number]   [facility] 4NW-A Attending Holly Cruz MD   Hosp Day # 1 PCP Benito Hurley MD     History of Present Illness:  Leonila Dela Cruz take 1/2 tab Twice a day, then Day 4-10 take 1 tab twice a day, then increase to 2 tabs twice a day (Patient taking differently: Take 150 mg by mouth nightly.  Day 1-3 take 1/2 tab Twice a day, then Day 4-10 take 1 tab twice a day, then increase to 2 tabs t DANGER OF REMAINING UNTREATED. HE CLAIMS HE DOES NOT DRIVE. HE BELIEVES HE FELT VERY BADLY ON KEPPRA. NEUROLOGY TO OPTIMIZE MEDICATIONS    HTN- SOMEWHAT BETTER.  PT MOOD PROBABLY A CONTRIBUTING FACTOR    NAUSEA-APPEARS TO HAVE IMPROVED    HYPERGLYCEMIA-NEW F

## 2020-01-28 NOTE — ED NOTES
PT wife, Tin Alvarado, can be contacted at the following phone number   home - [phone]  CELL - [phone]

## 2020-01-28 NOTE — PLAN OF CARE
Pt AOX4 with no complaints of pain. Pt denies any n/v/d. Pt denies SOB. Pt speech clear. Pt reading glasses left at home. No seizure activity noted. Seizure precautions in place. Pt without injuries. Pt with small eczema like patches scattered on legs.  Pt activity  Description  INTERVENTIONS:  - Maintain airway, patient safety  and administer oxygen as ordered  - Monitor patient for seizure activity, document and report duration and description of seizure to MD/LIP  - If seizure occurs, turn patient to side

## 2020-01-28 NOTE — ED NOTES
Assumed care, report received from Brenana Cervantes RN. Pt sound asleep, appears well rested. Resps easy, regular.  Will monitor

## 2020-01-28 NOTE — ED NOTES
While on CT table Pt began having a tonic-clonic seizure that lasted approx 1 min. No trauma occurred. Pt remained safe on CT bed. RN called for assistance, at which time another RN brought Ativan. 2mg Ativan admin. Pt vitals remained stable throughout.

## 2020-01-28 NOTE — TELEPHONE ENCOUNTER
Refused. Patient was just started on this medication.     Medication: OXCARBAZEPINE 300 MG Oral Tab    Date of last refill: 01/28/2020 (#60/1)  [place]    Last office visit: ER 01/27/2020  Due back to clinic per last

## 2020-01-28 NOTE — CONSULTS
[facility]    Report of Consultation    Chapincito Hagen Patient Status:  Inpatient    1955 MRN [id number]   [facility] 4NW-A Attending José Horne MD   Hosp Day # 0 PCP Selvin Álvarez MD     Date of Admission:  2020  D 68, temperature 98.2 °F (36.8 °C), temperature source Oral, resp. rate 18, weight 156 lb 4.9 oz (70.9 kg), SpO2 100 %. GENERAL:  Patient is a(n) 75year old male in no acute distress.   HEENT:  Normocephalic, atraumatic  LUNGS: Clear to auscultation bila months, and no bathing, operating heavy machinery, biking, climbing above own height    Thank you for allowing me to participate in the evaluation of your patient,    Pricila Clarke, DO  Neuromuscular and General Neurology  [facility]   pager [phone]

## 2020-01-28 NOTE — CM/SW NOTE
Mer Freeman, 01/28/20, 2:45 PM  Patient failed inpatient criteria. Second level of review completed and supports observation. UR committee in agreement. Discussed with Dr. Marin Gomez who approves observation status.  Observation letter given to the patient

## 2020-01-28 NOTE — ED NOTES
Pt in room resting comfortably at this time, vitals stable, Pt remains on cardiac monitor in NSR, Pt wife in room, bilateral side rails elevated, call light in reach

## 2020-01-28 NOTE — PROGRESS NOTES
NURSING ADMISSION NOTE      Patient admitted via cart to room 422. Oriented to room. Safety precautions initiated. Bed in low position. Call light in reach. Pt AOX4 with no complaints of pain. Pt reports hx of seizures during his sleep.  Pt is pr

## 2020-01-28 NOTE — PAYOR COMM NOTE
--------------  STATUS CHANGED TO OBSERVATION ON 1/28/20 PER ORDER BELOW    PLACE IN OBSERVATION (Order #[id number]) on 1/28/20      ADMISSION REVIEW     PayFroylan Chaudhry MA [facility]  Subscriber #:  [id number]  Authorization Number: [id number]    Admit date: 1/28/20

## 2020-01-29 LAB — LEVETIRACETAM (KEPPRA): <2 UG/ML

## 2020-01-29 RX ORDER — MULTIVIT-MIN/IRON FUM/FOLIC AC 7.5 MG-4
1 TABLET ORAL DAILY
Status: DISCONTINUED | OUTPATIENT
Start: 2020-01-29 | End: 2020-01-29

## 2020-01-29 RX ORDER — OMEGA-3 FATTY ACIDS/FISH OIL 300-1000MG
1 CAPSULE ORAL AS NEEDED
Status: DISCONTINUED | OUTPATIENT
Start: 2020-01-29 | End: 2020-01-29

## 2020-01-29 RX ORDER — MAGNESIUM OXIDE 400 MG (241.3 MG MAGNESIUM) TABLET
400 TABLET DAILY
Status: DISCONTINUED | OUTPATIENT
Start: 2020-01-29 | End: 2020-01-29

## 2020-01-29 RX ORDER — LEVETIRACETAM 500 MG/1
500 TABLET ORAL 2 TIMES DAILY
Status: DISCONTINUED | OUTPATIENT
Start: 2020-01-29 | End: 2020-01-29

## 2020-01-30 LAB — OXCARBAZEPINE METABOLITE: 2 UG/ML

## 2020-01-31 RX ORDER — OXCARBAZEPINE 300 MG/1
TABLET, FILM COATED ORAL
Qty: 174 TABLET | Refills: 0 | OUTPATIENT
Start: 2020-01-31

## 2020-01-31 NOTE — TELEPHONE ENCOUNTER
Per Epic review, pt seen by Dr. Altagracia Ruvalcaba during his recent hospitalization. Per Dr. Pablo Standard hospital note 1/28/2020:   Impression/Discussion/recommendations:  Noctural seizures, likely secondarily generalized  Medication noncompliance  Dehydration

## 2020-02-10 ENCOUNTER — APPOINTMENT (OUTPATIENT)
Dept: LAB | Facility: HOSPITAL | Age: 65
End: 2020-02-10
Attending: Other
Payer: MEDICARE

## 2020-02-10 DIAGNOSIS — G40.109 SYMPTOMATIC LOCALIZATION-RELATED EPILEPSY (HCC): ICD-10-CM

## 2020-02-10 PROCEDURE — 80183 DRUG SCRN QUANT OXCARBAZEPIN: CPT

## 2020-02-11 LAB — OXCARBAZEPINE METABOLITE: 11 UG/ML

## 2020-02-12 ENCOUNTER — TELEPHONE (OUTPATIENT)
Dept: NEUROLOGY | Facility: CLINIC | Age: 65
End: 2020-02-12

## 2020-02-12 NOTE — TELEPHONE ENCOUNTER
Patient's wife notified of lab results (Oxcarbazepine level=11) and informed we would notify patient if there were any further instructions after Dr All Mcintyre reviews results.

## 2020-03-19 ENCOUNTER — OFFICE VISIT (OUTPATIENT)
Dept: NEUROLOGY | Facility: CLINIC | Age: 65
End: 2020-03-19
Payer: MEDICARE

## 2020-03-19 VITALS
WEIGHT: 166 LBS | HEART RATE: 68 BPM | BODY MASS INDEX: 25 KG/M2 | RESPIRATION RATE: 16 BRPM | SYSTOLIC BLOOD PRESSURE: 138 MMHG | DIASTOLIC BLOOD PRESSURE: 90 MMHG

## 2020-03-19 DIAGNOSIS — G47.00 INSOMNIA, UNSPECIFIED TYPE: ICD-10-CM

## 2020-03-19 DIAGNOSIS — G40.109 SYMPTOMATIC LOCALIZATION-RELATED EPILEPSY (HCC): Primary | ICD-10-CM

## 2020-03-19 PROCEDURE — 99214 OFFICE O/P EST MOD 30 MIN: CPT | Performed by: OTHER

## 2020-03-19 RX ORDER — UBIDECARENONE 100 MG
100 CAPSULE ORAL DAILY
COMMUNITY

## 2020-03-19 NOTE — PROGRESS NOTES
The patient states no seizures since leaving the hospital. Patient states blurry vision since starting the medication which only last for about 30 minutes. Increase in fatigue.

## 2020-03-19 NOTE — PROGRESS NOTES
[facility] in [facility]  Neurology - Clinic Follow up      Maribel Guo Patient Status:  No patient class for patient encounter    1955 MRN [id number]   Location [unfilled] PCP Allan Chavez MD          Subjective:  Senora Loveless 3 - 35 ug/mL 11     MRI brain 3/2019  1. No acute infarct, acute intracranial hemorrhage, or hydrocephalus. 2. Trace chronic small vessel ischemic disease.   3. Punctate focus of gradient susceptibility within the posterior mid right cerebellum is nonsp tablet twice a day ), Disp: 60 tablet, Rfl: 1  •  Magnesium Cl-Calcium Carbonate 71.5-119 MG Oral Tab EC, Take 1 tablet by mouth daily. , Disp: , Rfl:   •  Tetrahydrozoline-Zn Sulfate (EYE DROPS ALLERGY RELIEF OP), , Disp: , Rfl:   •  Ibuprofen (ADVIL) 200 normal;  DTRs   Symmetric 2/4 in all limbs,   No Babinski sign,   COORDINATION: Normal FTN and HTS tests,   GAIT:  Normal gait, can do  tandem walk, romberg test is negative,   Special tests:     Labs:  Lab Results   Component Value Date     (H) 01/

## 2020-03-20 ENCOUNTER — TELEPHONE (OUTPATIENT)
Dept: NEUROLOGY | Facility: CLINIC | Age: 65
End: 2020-03-20

## 2020-03-20 DIAGNOSIS — G40.109 SYMPTOMATIC LOCALIZATION-RELATED EPILEPSY (HCC): Primary | ICD-10-CM

## 2020-03-20 RX ORDER — OXCARBAZEPINE 300 MG/1
TABLET, FILM COATED ORAL
Qty: 180 TABLET | Refills: 0 | Status: SHIPPED | OUTPATIENT
Start: 2020-03-20 | End: 2020-03-23

## 2020-03-20 NOTE — TELEPHONE ENCOUNTER
Per note from visit, pt to call back with pharmacy so Rx can be sent. Per reason for call, wanting to have Rx sent to mail order pharmacy.     Per note from visit:    Breakthrough seizure in the setting of noncompliance and dehydration  Having mild side eff

## 2020-03-23 RX ORDER — OXCARBAZEPINE 300 MG/1
TABLET, FILM COATED ORAL
Qty: 20 TABLET | Refills: 0 | Status: SHIPPED | OUTPATIENT
Start: 2020-03-23 | End: 2020-06-26

## 2020-03-23 NOTE — TELEPHONE ENCOUNTER
Mail order pharm will not be able to get medication to home address in time.   Pt wife wondering if could get 10 pills sent to [facility]    Rx Oxcarbazepine 300mg #20 1/2 tab in am, 1/2 tab mid-day and 1 tab qhs sent to [facility] per patient request.

## 2020-03-23 NOTE — TELEPHONE ENCOUNTER
Per Epic review, Rx for OXcarbazepine 300 MG Oral Tab sent to both local and mail order pharmacy. Called mail order pharmacy who states RX was received and will be rushed to pt. Pt can also  short supply from local pharmacy.     Spoke with pt'

## 2020-06-25 DIAGNOSIS — G40.109 SYMPTOMATIC LOCALIZATION-RELATED EPILEPSY (HCC): ICD-10-CM

## 2020-06-25 NOTE — TELEPHONE ENCOUNTER
Medication: OXCARBAZEPINE 300 MG Oral Tab    Date of last refill: 03/23/2020 (#180/0)  [place]    Last office visit: 3/19/2020  Due back to clinic per last office note:  Around 10/19/2020  Date next office visit sche
no discharge, no irritation, no pain, no redness, and no visual changes.

## 2020-06-26 RX ORDER — OXCARBAZEPINE 300 MG/1
TABLET, FILM COATED ORAL
Qty: 180 TABLET | Refills: 0 | Status: SHIPPED | OUTPATIENT
Start: 2020-06-26 | End: 2020-09-23

## 2020-07-09 ENCOUNTER — ORDER TRANSCRIPTION (OUTPATIENT)
Dept: PHYSICAL THERAPY | Facility: HOSPITAL | Age: 65
End: 2020-07-09

## 2020-07-09 DIAGNOSIS — M54.41 RIGHT-SIDED LOW BACK PAIN WITH RIGHT-SIDED SCIATICA: Primary | ICD-10-CM

## 2020-07-10 ENCOUNTER — APPOINTMENT (OUTPATIENT)
Dept: PHYSICAL THERAPY | Age: 65
End: 2020-07-10
Attending: INTERNAL MEDICINE
Payer: MEDICARE

## 2020-07-17 ENCOUNTER — OFFICE VISIT (OUTPATIENT)
Dept: PHYSICAL THERAPY | Age: 65
End: 2020-07-17
Attending: INTERNAL MEDICINE
Payer: MEDICARE

## 2020-07-17 DIAGNOSIS — M54.41 CHRONIC RIGHT-SIDED LOW BACK PAIN WITH RIGHT-SIDED SCIATICA: ICD-10-CM

## 2020-07-17 DIAGNOSIS — G89.29 CHRONIC RIGHT-SIDED LOW BACK PAIN WITH RIGHT-SIDED SCIATICA: ICD-10-CM

## 2020-07-17 PROCEDURE — 97110 THERAPEUTIC EXERCISES: CPT

## 2020-07-17 PROCEDURE — 97161 PT EVAL LOW COMPLEX 20 MIN: CPT

## 2020-07-17 NOTE — PROGRESS NOTES
SPINE EVALUATION:   Referring Provider: Yessy Panda   Referring Diagnosis: Right-sided low back pain with right-sided sciatica (M54.41)     Date of Service: 7/17/2020     PATIENT SUMMARY   Nicola Valencia is a 77year old male who presents to clinic today with yourself in the past?  No    ASSESSMENT  Amanda Banda presents to physical therapy evaluation with primary c/o R LBP radiating posterior R LE.  The results of the objective tests and measures show hypomobile lower lumbar spine, impaired right hip extension ROM, i stretch    Charges: PT Eval Low Complexity, TherEx x1 (15 min)      Total Timed Treatment: 15 min     Total Treatment Time: 45 min     PLAN OF CARE:    Goals:    · Pt will improve R hip PROM to >/=15 deg (6 visits)  · Pt will improve lumbar accessory mobil

## 2020-07-21 ENCOUNTER — OFFICE VISIT (OUTPATIENT)
Dept: PHYSICAL THERAPY | Age: 65
End: 2020-07-21
Attending: INTERNAL MEDICINE
Payer: MEDICARE

## 2020-07-21 DIAGNOSIS — M54.41 CHRONIC RIGHT-SIDED LOW BACK PAIN WITH RIGHT-SIDED SCIATICA: ICD-10-CM

## 2020-07-21 DIAGNOSIS — G89.29 CHRONIC RIGHT-SIDED LOW BACK PAIN WITH RIGHT-SIDED SCIATICA: ICD-10-CM

## 2020-07-21 PROCEDURE — 97112 NEUROMUSCULAR REEDUCATION: CPT

## 2020-07-21 PROCEDURE — 97110 THERAPEUTIC EXERCISES: CPT

## 2020-07-21 NOTE — PROGRESS NOTES
Dx: Right-sided low back pain with right-sided sciatica (M54.41)    Authorized # of Visits: 8 POC (7/17/2020 - 8/28/2020)  10 POC Next MD Visit: as needed   Fall Risk: Standard  Precautions: Seizures     Subjective:  \"It feels a little more sore than last

## 2020-07-24 ENCOUNTER — APPOINTMENT (OUTPATIENT)
Dept: PHYSICAL THERAPY | Age: 65
End: 2020-07-24
Attending: INTERNAL MEDICINE
Payer: MEDICARE

## 2020-07-27 ENCOUNTER — OFFICE VISIT (OUTPATIENT)
Dept: PHYSICAL THERAPY | Age: 65
End: 2020-07-27
Attending: INTERNAL MEDICINE
Payer: MEDICARE

## 2020-07-27 DIAGNOSIS — G89.29 CHRONIC RIGHT-SIDED LOW BACK PAIN WITH RIGHT-SIDED SCIATICA: ICD-10-CM

## 2020-07-27 DIAGNOSIS — M54.41 CHRONIC RIGHT-SIDED LOW BACK PAIN WITH RIGHT-SIDED SCIATICA: ICD-10-CM

## 2020-07-27 PROCEDURE — 97112 NEUROMUSCULAR REEDUCATION: CPT

## 2020-07-27 PROCEDURE — 97110 THERAPEUTIC EXERCISES: CPT

## 2020-07-27 NOTE — PROGRESS NOTES
Dx: Right-sided low back pain with right-sided sciatica (M54.41)    Authorized # of Visits: 8 POC (7/17/2020 - 8/28/2020)  10 POC Next MD Visit: as needed   Fall Risk: Standard  Precautions: Seizures     Subjective: No running/biking since last Wednesday. to prevent trunk rotation)          HEP: hip flexor stretch, quadruped alt LE, Child's pose    Charges:  TherEx x2 (30 min), NMR x1 (10 min)       Total Timed Treatment: 40 min  Total Treatment Time: 40 min

## 2020-07-30 ENCOUNTER — OFFICE VISIT (OUTPATIENT)
Dept: PHYSICAL THERAPY | Age: 65
End: 2020-07-30
Attending: INTERNAL MEDICINE
Payer: MEDICARE

## 2020-07-30 DIAGNOSIS — G89.29 CHRONIC RIGHT-SIDED LOW BACK PAIN WITH RIGHT-SIDED SCIATICA: ICD-10-CM

## 2020-07-30 DIAGNOSIS — M54.41 CHRONIC RIGHT-SIDED LOW BACK PAIN WITH RIGHT-SIDED SCIATICA: ICD-10-CM

## 2020-07-30 PROCEDURE — 97140 MANUAL THERAPY 1/> REGIONS: CPT

## 2020-07-30 PROCEDURE — 97112 NEUROMUSCULAR REEDUCATION: CPT

## 2020-07-30 PROCEDURE — 97110 THERAPEUTIC EXERCISES: CPT

## 2020-07-30 NOTE — PROGRESS NOTES
Dx: Right-sided low back pain with right-sided sciatica (M54.41)    Authorized # of Visits: 8 POC (7/17/2020 - 8/28/2020)  10 POC Next MD Visit: as needed   Fall Risk: Standard  Precautions: Seizures     Subjective: \"Haven't done the exercises. \" Was able extension 3x10 reps (cues to prevent trunk rotation) NMR:  -Lumbar flexion/extension in quadruped x30 reps (cues to prevent excessive thoracic motion)  -Quadruped alt LE extension 3x10 reps (cues to prevent trunk rotation)  -SLS with FWD/LAT opp toe taps,

## 2020-08-03 ENCOUNTER — OFFICE VISIT (OUTPATIENT)
Dept: PHYSICAL THERAPY | Age: 65
End: 2020-08-03
Attending: INTERNAL MEDICINE
Payer: MEDICARE

## 2020-08-03 DIAGNOSIS — G89.29 CHRONIC RIGHT-SIDED LOW BACK PAIN WITH RIGHT-SIDED SCIATICA: ICD-10-CM

## 2020-08-03 DIAGNOSIS — M54.41 CHRONIC RIGHT-SIDED LOW BACK PAIN WITH RIGHT-SIDED SCIATICA: ICD-10-CM

## 2020-08-03 PROCEDURE — 97140 MANUAL THERAPY 1/> REGIONS: CPT

## 2020-08-03 PROCEDURE — 97110 THERAPEUTIC EXERCISES: CPT

## 2020-08-03 PROCEDURE — 97112 NEUROMUSCULAR REEDUCATION: CPT

## 2020-08-03 NOTE — PROGRESS NOTES
Dx: Right-sided low back pain with right-sided sciatica (M54.41)    Authorized # of Visits: 8 POC (7/17/2020 - 8/28/2020)  10 POC Next MD Visit: as needed   Fall Risk: Standard  Precautions: Seizures     Subjective: \"I didn't do any exercise. \" He states TherEx:  -Hip flexor stretch, half kneel, R/L, 2x30 sec hold  -Child's pose: Midline 10x10 sec hold; R 10 x10 sec hold; L 10x10 sec hold TherEx:  -Hip flexor stretch, half kneel, R/L, 2x30 sec hold  -Child's pose: Midline 10x10 sec hold; R 10 x10 sec hold;

## 2020-08-06 ENCOUNTER — OFFICE VISIT (OUTPATIENT)
Dept: PHYSICAL THERAPY | Age: 65
End: 2020-08-06
Attending: INTERNAL MEDICINE
Payer: MEDICARE

## 2020-08-17 ENCOUNTER — TELEPHONE (OUTPATIENT)
Dept: PHYSICAL THERAPY | Age: 65
End: 2020-08-17

## 2020-08-19 ENCOUNTER — APPOINTMENT (OUTPATIENT)
Dept: PHYSICAL THERAPY | Age: 65
End: 2020-08-19
Attending: INTERNAL MEDICINE
Payer: MEDICARE

## 2020-08-26 ENCOUNTER — OFFICE VISIT (OUTPATIENT)
Dept: PHYSICAL THERAPY | Age: 65
End: 2020-08-26
Attending: INTERNAL MEDICINE
Payer: MEDICARE

## 2020-08-26 PROCEDURE — 97140 MANUAL THERAPY 1/> REGIONS: CPT

## 2020-08-26 PROCEDURE — 97112 NEUROMUSCULAR REEDUCATION: CPT

## 2020-08-26 PROCEDURE — 97110 THERAPEUTIC EXERCISES: CPT

## 2020-08-26 NOTE — PROGRESS NOTES
Dx: Right-sided low back pain with right-sided sciatica (M54.41)    Authorized # of Visits: 8 POC (7/17/2020 - 8/28/2020)  10 POC Next MD Visit: as needed   Fall Risk: Standard  Precautions: Seizures     Subjective:  Had to cancel the last session due to mi flexor stretch, half kneel, R/L, 2x30 sec hold  -Child's pose: Midline 10x10 sec hold; R 10 x10 sec hold; L 10x10 sec hold TherEx:  -Hip flexor stretch, half kneel, R/L, 2x30 sec hold  -Child's pose: Midline 10x10 sec hold; R 10 x10 sec hold; L 10x10 sec h Treatment Time: 40 min

## 2020-09-02 ENCOUNTER — OFFICE VISIT (OUTPATIENT)
Dept: PHYSICAL THERAPY | Age: 65
End: 2020-09-02
Attending: INTERNAL MEDICINE
Payer: MEDICARE

## 2020-09-02 PROCEDURE — 97110 THERAPEUTIC EXERCISES: CPT

## 2020-09-02 NOTE — PROGRESS NOTES
Dx: Right-sided low back pain with right-sided sciatica (M54.41)    Authorized # of Visits: 6 Auth (8/29/2020 - 10/10/2020) [12 total]  10 POC Next MD Visit: as needed   Fall Risk: Standard  Precautions: Seizures      Discharge Summary  Pt has attended 7 v hold  -Alt LE march 2x10 reps  -Sequential LE marching 2x10 reps  -Bilateral LE raise/lower with posterior pelvic tilt, 2x10 reps TherEx:  -Hip flexor stretch, half kneel, R/L, 2x30 sec hold  -Alt LE march 2x10 reps  -Sequential LE marching 2x10 reps  -Chi LE extension 3x10 reps (cues to prevent trunk rotation)  -SLS with FWD/LAT opp toe taps, R/L, 3x30 sec  -     Manual:  -L1-5 C-PA gr I-III (L side-lying) x10 min Manual:  -L1-5 C-PA gr I-III (L side-lying) x10 min Manual:  -L1-5 C-PA gr I-III (L side-lying

## 2020-09-22 DIAGNOSIS — G40.109 SYMPTOMATIC LOCALIZATION-RELATED EPILEPSY (HCC): ICD-10-CM

## 2020-09-23 RX ORDER — OXCARBAZEPINE 300 MG/1
TABLET, FILM COATED ORAL
Qty: 180 TABLET | Refills: 0 | Status: SHIPPED | OUTPATIENT
Start: 2020-09-23 | End: 2021-02-05

## 2020-09-23 NOTE — TELEPHONE ENCOUNTER
Medication: OXCARBAZEPINE 300 MG Oral Tab    Date of last refill: 06/26/2020 (#180/0)  [place]    Last office visit: 03/19/2020  Due back to clinic per last office note:  Around 10/19/2020  Date next office visit godfrey

## 2020-10-01 ENCOUNTER — OFFICE VISIT (OUTPATIENT)
Dept: NEUROLOGY | Facility: CLINIC | Age: 65
End: 2020-10-01
Payer: MEDICARE

## 2020-10-01 VITALS
SYSTOLIC BLOOD PRESSURE: 138 MMHG | RESPIRATION RATE: 16 BRPM | DIASTOLIC BLOOD PRESSURE: 80 MMHG | HEART RATE: 76 BPM | BODY MASS INDEX: 23 KG/M2 | WEIGHT: 158 LBS

## 2020-10-01 DIAGNOSIS — G43.009 MIGRAINE WITHOUT AURA AND WITHOUT STATUS MIGRAINOSUS, NOT INTRACTABLE: ICD-10-CM

## 2020-10-01 DIAGNOSIS — G40.109 SYMPTOMATIC LOCALIZATION-RELATED EPILEPSY (HCC): Primary | ICD-10-CM

## 2020-10-01 PROCEDURE — 99215 OFFICE O/P EST HI 40 MIN: CPT | Performed by: OTHER

## 2020-10-01 PROCEDURE — 3075F SYST BP GE 130 - 139MM HG: CPT | Performed by: OTHER

## 2020-10-01 PROCEDURE — 3079F DIAST BP 80-89 MM HG: CPT | Performed by: OTHER

## 2020-10-01 RX ORDER — SUMATRIPTAN 25 MG/1
TABLET, FILM COATED ORAL
Qty: 9 TABLET | Refills: 0 | Status: SHIPPED | OUTPATIENT
Start: 2020-10-01

## 2020-10-01 NOTE — PROGRESS NOTES
The patient states he is only taking the medication as a half tab tid. This started about a month ago. Patient denies recent seizures.

## 2020-10-01 NOTE — PROGRESS NOTES
[facility] [place] [facility] [place]  Neurology - Clinic Follow up      Trousdaledieudonne Walters Patient Status:  No patient class for patient encounter    1955 MRN [id number]   Location [unfilled] PCP Ale Gillis MD          Subjective:  Ana Rai cheeks, that he thinks started since starting Trileptal. It has since improved. Reports that the night dose of Trileptal was making him have diplopia, and therefore he cut trileptal down to 150mg TID from 300mg BID. He is on 150mg TID since 8/27/20.  Report (PROBIOTIC OR), Take 2 capsules by mouth daily. , Disp: , Rfl:   •  Multiple Vitamins-Minerals (MULTIVITAMIN OR), Take by mouth., Disp: , Rfl:   •  Specialty Vitamins Products (PROSTATE OR), Take 1 capsule by mouth daily. , Disp: , Rfl:   •  cholecalciferol RRR  Lungs: CTA B/L;  Musculoskeletal: no joint tenderness, others see neuro exam;  Extremities:  no pitting edema, no cyanosis or skin rash,  pulse is present,  Neurologic Examination  Mental Status  Is intact for age, and Language is intact, no slurred s is not willing to overlap and then switch to a different seizure medication.   At this time, we will continue Trileptal 150mg TID  Intermittent migraines- discussed different triggers, habits to avoid, rescue and preventative treatment, trial sumatriptan  S

## 2020-10-20 ENCOUNTER — LAB ENCOUNTER (OUTPATIENT)
Dept: LAB | Facility: HOSPITAL | Age: 65
End: 2020-10-20
Attending: INTERNAL MEDICINE
Payer: MEDICARE

## 2020-10-20 DIAGNOSIS — R53.83 FATIGUE: ICD-10-CM

## 2020-10-20 DIAGNOSIS — G40.109 SYMPTOMATIC LOCALIZATION-RELATED EPILEPSY (HCC): ICD-10-CM

## 2020-10-20 DIAGNOSIS — N40.0 BENIGN ENLARGEMENT OF PROSTATE: ICD-10-CM

## 2020-10-20 DIAGNOSIS — G40.901 CONVULSIVE STATUS EPILEPTICUS (HCC): ICD-10-CM

## 2020-10-20 DIAGNOSIS — E78.2 MIXED HYPERLIPIDEMIA: Primary | ICD-10-CM

## 2020-10-20 DIAGNOSIS — R73.01 ELEVATED FASTING GLUCOSE: ICD-10-CM

## 2020-10-20 DIAGNOSIS — Z79.899 POLYPHARMACY: ICD-10-CM

## 2020-10-20 PROCEDURE — 85025 COMPLETE CBC W/AUTO DIFF WBC: CPT

## 2020-10-20 PROCEDURE — 83036 HEMOGLOBIN GLYCOSYLATED A1C: CPT

## 2020-10-20 PROCEDURE — 36415 COLL VENOUS BLD VENIPUNCTURE: CPT

## 2020-10-20 PROCEDURE — 84153 ASSAY OF PSA TOTAL: CPT

## 2020-10-20 PROCEDURE — 84443 ASSAY THYROID STIM HORMONE: CPT

## 2020-10-20 PROCEDURE — 80053 COMPREHEN METABOLIC PANEL: CPT

## 2020-10-20 PROCEDURE — 80183 DRUG SCRN QUANT OXCARBAZEPIN: CPT

## 2020-10-20 PROCEDURE — 80061 LIPID PANEL: CPT

## 2021-02-05 DIAGNOSIS — G40.109 SYMPTOMATIC LOCALIZATION-RELATED EPILEPSY (HCC): ICD-10-CM

## 2021-02-05 RX ORDER — OXCARBAZEPINE 300 MG/1
TABLET, FILM COATED ORAL
Qty: 135 TABLET | Refills: 0 | Status: SHIPPED | OUTPATIENT
Start: 2021-02-05 | End: 2021-05-11

## 2021-02-05 NOTE — TELEPHONE ENCOUNTER
Medication: OXCARBAZEPINE 300 MG Oral Tab    Date of last refill: 09/23/2020 (#180/0)  [place]    Last office visit: 10/01/2020  Due back to clinic per last office note:  Around 04/01/2021  Date next office visit godfrey

## 2021-02-11 ENCOUNTER — OFFICE VISIT (OUTPATIENT)
Dept: NEUROLOGY | Facility: CLINIC | Age: 66
End: 2021-02-11
Payer: MEDICARE

## 2021-02-11 VITALS
HEART RATE: 68 BPM | RESPIRATION RATE: 16 BRPM | WEIGHT: 157 LBS | DIASTOLIC BLOOD PRESSURE: 70 MMHG | SYSTOLIC BLOOD PRESSURE: 124 MMHG | BODY MASS INDEX: 23 KG/M2

## 2021-02-11 DIAGNOSIS — G40.109 SYMPTOMATIC LOCALIZATION-RELATED EPILEPSY (HCC): Primary | ICD-10-CM

## 2021-02-11 DIAGNOSIS — G43.009 MIGRAINE WITHOUT AURA AND WITHOUT STATUS MIGRAINOSUS, NOT INTRACTABLE: ICD-10-CM

## 2021-02-11 DIAGNOSIS — E87.1 HYPONATREMIA: ICD-10-CM

## 2021-02-11 PROCEDURE — 99213 OFFICE O/P EST LOW 20 MIN: CPT | Performed by: OTHER

## 2021-02-11 PROCEDURE — 3074F SYST BP LT 130 MM HG: CPT | Performed by: OTHER

## 2021-02-11 PROCEDURE — 3078F DIAST BP <80 MM HG: CPT | Performed by: OTHER

## 2021-02-11 NOTE — PROGRESS NOTES
[place] [facility]  Neurology - Clinic Follow up      Romayne Going Patient Status:  No patient class for patient encounter    1955 MRN [id number]   [facility]          Subjective:  Alex Gutierrez cheeks, that he thinks started since starting Trileptal. It has since improved. Reports that the night dose of Trileptal was making him have diplopia, and therefore he cut trileptal down to 150mg TID from 300mg BID. He is on 150mg TID since 8/27/20.  Report MCHC      31.0 - 37.0 g/dL 35.0   RDW      11.0 - 15.0 % 12.2   RDW-SD      35.1 - 46.3 fL 39.9     MRI brain 3/2019  1. No acute infarct, acute intracranial hemorrhage, or hydrocephalus. 2. Trace chronic small vessel ischemic disease.   3. Punctate focu Rfl:   •  Tetrahydrozoline-Zn Sulfate (EYE DROPS ALLERGY RELIEF OP), , Disp: , Rfl:   •  Ibuprofen (ADVIL) 200 MG Oral Cap, Take 1 capsule by mouth as needed. , Disp: , Rfl:   •  SUMAtriptan Succinate 25 MG Oral Tab, Use at onset; repeat once after 2 hours- touch, pin and vibration intact, position sense is normal;  DTRs   Symmetric 2/4 in all limbs,   No Babinski sign,   COORDINATION: Normal FTN and HTS tests,   GAIT:  Normal gait, can do  tandem walk, romberg test is negative,   Special tests:     Labs:  La

## 2021-05-11 DIAGNOSIS — G40.109 SYMPTOMATIC LOCALIZATION-RELATED EPILEPSY (HCC): ICD-10-CM

## 2021-05-11 NOTE — TELEPHONE ENCOUNTER
Per OV note - to decrease Trileptal to 150 mg BID. Still has labs pending. Patient is active on MyChart. Sent a reminder about labs.     Medication: OXCARBAZEPINE 300 MG     Date of last refill: 02/05/2021 (#135/0)  [place]

## 2021-05-12 RX ORDER — OXCARBAZEPINE 300 MG/1
150 TABLET, FILM COATED ORAL 2 TIMES DAILY
Qty: 90 TABLET | Refills: 0 | Status: SHIPPED | OUTPATIENT
Start: 2021-05-12 | End: 2021-08-05

## 2021-06-02 ENCOUNTER — TELEPHONE (OUTPATIENT)
Dept: NEUROLOGY | Facility: CLINIC | Age: 66
End: 2021-06-02

## 2021-06-02 NOTE — TELEPHONE ENCOUNTER
Trileptal level low at 5.1. He had decreased Trileptal on his own to 150mg TID alternating with 150mg BID alternating every other day. Above level is on this dose that he self decreased to. His rash is gone. Discussed with patient. He denies any seizures.

## 2021-08-05 ENCOUNTER — OFFICE VISIT (OUTPATIENT)
Dept: NEUROLOGY | Facility: CLINIC | Age: 66
End: 2021-08-05
Payer: MEDICARE

## 2021-08-05 VITALS
SYSTOLIC BLOOD PRESSURE: 130 MMHG | RESPIRATION RATE: 16 BRPM | TEMPERATURE: 98 F | OXYGEN SATURATION: 98 % | DIASTOLIC BLOOD PRESSURE: 80 MMHG | HEART RATE: 78 BPM | BODY MASS INDEX: 21.57 KG/M2 | HEIGHT: 69.5 IN | WEIGHT: 149 LBS

## 2021-08-05 DIAGNOSIS — G43.009 MIGRAINE WITHOUT AURA AND WITHOUT STATUS MIGRAINOSUS, NOT INTRACTABLE: ICD-10-CM

## 2021-08-05 DIAGNOSIS — E87.1 HYPONATREMIA: ICD-10-CM

## 2021-08-05 DIAGNOSIS — G40.109 SYMPTOMATIC LOCALIZATION-RELATED EPILEPSY (HCC): Primary | ICD-10-CM

## 2021-08-05 PROCEDURE — 3079F DIAST BP 80-89 MM HG: CPT | Performed by: OTHER

## 2021-08-05 PROCEDURE — 99215 OFFICE O/P EST HI 40 MIN: CPT | Performed by: OTHER

## 2021-08-05 PROCEDURE — 3075F SYST BP GE 130 - 139MM HG: CPT | Performed by: OTHER

## 2021-08-05 PROCEDURE — 3008F BODY MASS INDEX DOCD: CPT | Performed by: OTHER

## 2021-08-05 RX ORDER — OXCARBAZEPINE 300 MG/1
150 TABLET, FILM COATED ORAL 3 TIMES DAILY
Qty: 90 TABLET | Refills: 3 | Status: SHIPPED | OUTPATIENT
Start: 2021-08-05 | End: 2022-01-04

## 2021-08-05 NOTE — PROGRESS NOTES
[facility] in [facility]  Neurology - Clinic Follow up      Maribel Guo Patient Status:  No patient class for patient encounter    1955 MRN [id number]   Location [unfilled] PCP Allan Chavez MD          Subjective:  Senora Loveless cheeks, that he thinks started since starting Trileptal. It has since improved. Reports that the night dose of Trileptal was making him have diplopia, and therefore he cut trileptal down to 150mg TID from 300mg BID. He is on 150mg TID since 8/27/20.  Report 35.0 mcg/mL 5.1 (L)   Above level is on Trileptal 150mg BID alternating with 150mg TID q o d      Component      Latest Ref Rng & Units 10/20/2020   OXCARBAZEPINE METABOLITE      3 - 35 ug/mL 10   Level on 150mg TID    Component      Latest Ref Rng & Units significance of this is   uncertain, and clinical correlation is needed.     PAST MEDICAL HISTORY:   Past Medical History:   Diagnosis Date   • Essential hypertension    • High blood pressure    • Seizure disorder (HCC)        PAST SURGICAL HISTORY: History incontinence; Neurological:  See history; relevant items discussed in the history.   Psychiatric: no depression, no suicidal attempt,   Musculoskeletal:  NO current complaint,  Endo: no cold intolerance, appetite is normal, no weight change;  Skin: warm, n above    Assessment/Plan:   Symptomatic localization-related epilepsy (hcc)  (primary encounter diagnosis)  Migraine without aura and without status migrainosus, not intractable     Discussion:  Had another discussion about dosing of trileptal, that [facility]

## 2021-11-10 ENCOUNTER — TELEPHONE (OUTPATIENT)
Dept: NEUROLOGY | Facility: CLINIC | Age: 66
End: 2021-11-10

## 2021-12-25 DIAGNOSIS — G40.109 SYMPTOMATIC LOCALIZATION-RELATED EPILEPSY (HCC): ICD-10-CM

## 2021-12-27 NOTE — TELEPHONE ENCOUNTER
Medication: OXCARBAZEPINE 300 MG     Date of last refill: 8/5/21 (#90/3R)  [place]     Last office visit: 8/5/21  Due back to clinic per last office note:  6 mon  Date next office visit scheduled:    No future appoin

## 2022-01-04 RX ORDER — OXCARBAZEPINE 300 MG/1
TABLET, FILM COATED ORAL
Qty: 90 TABLET | Refills: 3 | Status: SHIPPED | OUTPATIENT
Start: 2022-01-04

## 2022-07-22 DIAGNOSIS — G40.109 SYMPTOMATIC LOCALIZATION-RELATED EPILEPSY (HCC): Primary | ICD-10-CM

## 2022-07-22 RX ORDER — SUMATRIPTAN 25 MG/1
TABLET, FILM COATED ORAL
Qty: 9 TABLET | Refills: 0 | Status: SHIPPED | OUTPATIENT
Start: 2022-07-22

## 2024-11-11 NOTE — ED INITIAL ASSESSMENT (HPI)
Slurred speech and aphasia since 1530 today and seizure at 7pm, blood glucose 114  PT presents to ER A&Ox4, breathing non labored, moves all extremities w/o difficulty and no slurred speech detected.  Pt rpt he woke up this morning around 9:30AM and went ba
declines

## (undated) NOTE — LETTER
Patient Name: Eduardo Cody  YOB: 1955          MRN number:  [id number]  Date:  9/2/2020  Referring Physician:  Tre Todd    Discharge Summary  Pt has attended 7 visits in Physical Therapy.      Assessment: Good progress since starting P

## (undated) NOTE — LETTER
05/29/19  Oneida Dixon    Dear Ingrid Multani,    We are contacting you from Dr. Brii Trevino office. Your health is important to us.   Therefore, we are sending this friendly reminder that you have the following overdue labs and/or tests which were ordered at your

## (undated) NOTE — LETTER
11/10/21  Julien Rankin [place] [id number]    Dear Cristobal Li,    We are contacting you from Dr. Skye Delcid office. Your health is important to us.   Therefore, we are sending this friendly reminder that you have the following overdue l